# Patient Record
(demographics unavailable — no encounter records)

---

## 2025-02-06 NOTE — END OF VISIT
[] : Fellow [FreeTextEntry3] : I personally saw and examined this patient with the fellow physician and was present for the key portions of history taking, examination as well as the Mohs procedures performed .  I agree with the assessment and plan as documented in the fellow's note unless noted below.   Robinson Garcia MD Physician, Dermatology and Dermatologic Surgery [facility]

## 2025-02-06 NOTE — END OF VISIT
[] : Fellow [FreeTextEntry3] : I personally saw and examined this patient with the fellow physician and was present for the key portions of history taking, examination as well as the Mohs procedures performed .  I agree with the assessment and plan as documented in the fellow's note unless noted below.   Robinson Garcia MD Physician, Dermatology and Dermatologic Surgery [facility]

## 2025-02-06 NOTE — PROCEDURE
[TextEntry] : Mohs Surgery Procedure Note   A surgical time out was taken to confirm the patient's correct identity and the correct site. The operative site was identified by the patient and surgical team prior to surgery and the patient agreed to proceed with the surgical site which was marked prior to anesthesia infiltration.   Mohs Micrographic Surgery Report Date Collected: 02/05/2025 Date Received: Same as above Date Verified: Same as above Attending Surgeon: Robinson Garcia MD Fellow: Ann Kerr MD Assistants: Desirae Tom RN, Dyana Gonzalez MA Procedure#:  Diagnosis: BCC micronodular Location: Right Nasal Ala Pre-op Size: 1.6 cm x 1.1 cm Post-Operative Final Defect Size: 4.0 cm x 3.7 cm Stages (number of pieces per stage): 4 (2/1, 2/2, 1/3, 1/4) Pathology, if tumor noted in stages: Debulk with atypical basaloid proliferation c/w BCC. Stage I with atypical basaloid proliferation micronodular in the dermis and subcutis of piece 2. Stage II with atypical basaloid proliferation c/w BCC into the reticular dermis of piece 3. Stage III with atypical basaloid proliferation c/w BCC in the epidermis of piece 5. Stage IV with sparse perivascular lymphocytic infiltrate without evidence of residual carcinoma on sections examined  Indications for procedure: Ill-defined tumor margins, high-priority anatomic location for preservation of normal tissue, aggressive histologic nature of the tumor Repair type: N/A (Repair to be performed by Dr. Deutsch) Total volume of anesthesia: 15 mL    Mohs Procedure Report:   The patient was escorted to the outpatient surgical operatory. The proposed Mohs procedure and reconstruction options were discussed with the patient. The risks, benefits, and alternatives were discussed and the patient signed a written consent form. A time out was taken to confirm the patient's identity and the exact location of the skin cancer. After the patient was placed in a recumbent position, the surgical site was cleaned with alcohol and either Betadine (for eyes and ears) or chlorhexidine gluconate, draped, and infiltrated with 0.5% lidocaine with 1:200,000 epinephrine local anesthetic. A sterile surgical marking pen was used to outline a thin margin of normal-appearing skin around the tumor. A beveled incision was then made using a scalpel. Small orienting nicks were made on the specimen and the surrounding skin. The tissue was then sharply dissected from the surrounding skin. Hemostasis was maintained with the electrosurgical unit and/or pressure. A temporary dressing was placed on the surgical defect until the frozen section slides were interpreted. The oriented specimen was placed in a Tha dish and transported to the Mohs laboratory. For each stage of the procedure, a visual representation of the specimen was drawn on a Mohs map. This map graphically depicts the specimen's two-dimensional appearance, orientation, and tissue preparation, which consists of dividing the specimen and applying tissue dyes. Because the deep and peripheral portions of the tissue are then embedded in the same geometric plane, the map also represents an oriented scale drawing of the resulting histologic sections. The Mohs technician then prepared frozen section slides using standard techniques. The slides were stained with hematoxylin and eosin, and cover slips were applied. The frozen section slides were then examined under the microscope. If tumor was found, it was localized on the map. The orienting nicks on the original specimen corresponded to similar nicks on the surgical defect so areas of identified tumor could be mapped back to the patient and resected. Additional layers of removed skin were then processed as indicated above. This iterative process continued as applicable until no tumor was observed microscopically. At this stage, the Mohs resection was complete.  Referral for Reconstruction   After the Mohs procedure was completed, the patient was brought back to the minor surgery operatory. As previously arranged, Dr. Deutsch will repair the surgical defect. A pressure dressing composed of Vaseline ointment, Telfa and sterile gauze was securely taped into place. The patient was given complete verbal and written wound care instructions and was asked to follow up as requested. The patient ambulated from the minor surgery operatory without problems.

## 2025-02-06 NOTE — ASSESSMENT
[FreeTextEntry1] : Mohs surgery for BCC nodular and micronodular on the R nasal ala -- 4 stage(s) of Mohs surgery performed 02/05/2025 -- Repair to be performed by Dr. Deutsch in OR today -- f/u for wound check per Dr. Deutsch -- f/u for routine skin exams as previously recommended by Her referring dermatologist.   Thank you for this Mohs surgery referral.  Robinson Garcia MD Physician, Dermatology & Dermatologic Surgery [facility].

## 2025-02-06 NOTE — HISTORY OF PRESENT ILLNESS
[FreeTextEntry1] : Mohs surgery for micronodular BCC Right Nasal Ala [de-identified] : 02/05/2025  Referred by: Dr. Godinez  We had the pleasure of seeing your patient for Mohs Micrographic Surgery.  Ms. NEREYDA GILMORE is a 91 year old F who presents for evaluation of a BCC on the right nasal ala with micronodular features. Had noted as a growing bump x mos prior to bx. No previous treatment. Had been seen in July 2024 for the biopsy and treatment was delayed until now, she is set up for Mohs today and repair with Dr. Deutsch in the OR this afternoon.  Pertinent positives noted below  History of HIV or hepatitis: No Blood thinners: none Antibiotic Prophylaxis: None  Medical implants: None  Social History: no tobacco or alcohol  The patient's review of systems questionnaire was reviewed. Education needs were identified. There were no barriers to learning.  Mohs surgery consultation for Micronodular BCC Right Nasal Ala  -- I explained the treatment options of topical immunomodulatory or chemotherapy, electrodessication and curettage, radiation therapy, excision and Mohs surgery.  I recommended Mohs surgery due to the tumor type, location, and ill-defined nature of cancer.   Mohs Appropriate Use Criteria (AUC) Score: 9  I explained that following extirpation there will be a full thickness defect of the involved area. The reconstructive options will be based on the defect size and surrounding tissue laxity of the involved area. Primary closure is only possible for smaller defects. For larger defects, local tissue rearrangement or skin grafting may be necessary. Risks following layered primary closure or local tissue rearrangement include wound dehiscence, contour irregularity, bleeding, infection, and paresthesia (nerve damage including sensory deficit or motor weakness). Risks following skin grafting include wound dehiscence, skin graft nonadherence (partial or complete), contour irregularity, bleeding, infection, paresthesia, and donor site complications. I explained that the patient will need to abstain from physical activity for 1-2 weeks following the surgery, that they would heal with a scar, and also discussed the chances of infection, bleeding, potential sensory or motor nerve damage, and recurrence.  The patient indicated that s/he understood the risks and wished to proceed today -- In particular, for reconstruction we discussed repair with Dr. Deutsch in Plastics as scheduled  Thank you for this Mohs surgery referral. We recommended that Ms. NEREYDA GILMORE follow up with Her referring dermatologist for routine skin exams following surgery.

## 2025-02-06 NOTE — ASSESSMENT
[FreeTextEntry1] : Mohs surgery for BCC nodular and micronodular on the R nasal ala -- 4 stage(s) of Mohs surgery performed 02/05/2025 -- Repair to be performed by Dr. Deutsch in OR today -- f/u for wound check per Dr. Deutsch -- f/u for routine skin exams as previously recommended by Her referring dermatologist.   Thank you for this Mohs surgery referral.  Robinson Garcia MD Physician, Dermatology & Dermatologic Surgery [facility].

## 2025-02-06 NOTE — PHYSICAL EXAM
[Alert] : alert [Oriented x 3] : ~L oriented x 3 [Well Nourished] : well nourished [Conjunctiva Non-injected] : conjunctiva non-injected [No Visual Lymphadenopathy] : no visual  lymphadenopathy [No Clubbing] : no clubbing [No Edema] : no edema [No Bromhidrosis] : no bromhidrosis [No Chromhidrosis] : no chromhidrosis [Hair] : Hair [Scalp] : Scalp [Face] : Face [Nose] : Nose [Eyelids] : Eyelids [Ears] : Ears [Neck] : Neck [Nodes] : Nodes [FreeTextEntry3] : -right nasal ala with 1.6 cm x 1.1 cm pink nodule and scar -no cervical adenopathy

## 2025-02-06 NOTE — HISTORY OF PRESENT ILLNESS
[FreeTextEntry1] : Mohs surgery for micronodular BCC Right Nasal Ala [de-identified] : 02/05/2025  Referred by: Dr. Godinez  We had the pleasure of seeing your patient for Mohs Micrographic Surgery.  Ms. NEREYDA GILMORE is a 91 year old F who presents for evaluation of a BCC on the right nasal ala with micronodular features. Had noted as a growing bump x mos prior to bx. No previous treatment. Had been seen in July 2024 for the biopsy and treatment was delayed until now, she is set up for Mohs today and repair with Dr. Deutsch in the OR this afternoon.  Pertinent positives noted below  History of HIV or hepatitis: No Blood thinners: none Antibiotic Prophylaxis: None  Medical implants: None  Social History: no tobacco or alcohol  The patient's review of systems questionnaire was reviewed. Education needs were identified. There were no barriers to learning.  Mohs surgery consultation for Micronodular BCC Right Nasal Ala  -- I explained the treatment options of topical immunomodulatory or chemotherapy, electrodessication and curettage, radiation therapy, excision and Mohs surgery.  I recommended Mohs surgery due to the tumor type, location, and ill-defined nature of cancer.   Mohs Appropriate Use Criteria (AUC) Score: 9  I explained that following extirpation there will be a full thickness defect of the involved area. The reconstructive options will be based on the defect size and surrounding tissue laxity of the involved area. Primary closure is only possible for smaller defects. For larger defects, local tissue rearrangement or skin grafting may be necessary. Risks following layered primary closure or local tissue rearrangement include wound dehiscence, contour irregularity, bleeding, infection, and paresthesia (nerve damage including sensory deficit or motor weakness). Risks following skin grafting include wound dehiscence, skin graft nonadherence (partial or complete), contour irregularity, bleeding, infection, paresthesia, and donor site complications. I explained that the patient will need to abstain from physical activity for 1-2 weeks following the surgery, that they would heal with a scar, and also discussed the chances of infection, bleeding, potential sensory or motor nerve damage, and recurrence.  The patient indicated that s/he understood the risks and wished to proceed today -- In particular, for reconstruction we discussed repair with Dr. Deutsch in Plastics as scheduled  Thank you for this Mohs surgery referral. We recommended that Ms. NEREYDA GILMORE follow up with Her referring dermatologist for routine skin exams following surgery.

## 2025-04-22 NOTE — PHYSICAL EXAM
[No Acute Distress] : no acute distress [Well Nourished] : well nourished [Well Developed] : well developed [Well-Appearing] : well-appearing [No Respiratory Distress] : no respiratory distress  [No Accessory Muscle Use] : no accessory muscle use [Clear to Auscultation] : lungs were clear to auscultation bilaterally [Normal Rate] : normal rate  [Regular Rhythm] : with a regular rhythm [Normal] : normal rate, regular rhythm, normal S1 and S2 and no murmur heard [No Joint Swelling] : no joint swelling [No Rash] : no rash [Normal Affect] : the affect was normal [Alert and Oriented x3] : oriented to person, place, and time [Normal Mood] : the mood was normal [Normal Insight/Judgement] : insight and judgment were intact

## 2025-04-22 NOTE — PLAN
[FreeTextEntry1] :  Labs and EKG reviewed and are within normal limits  Pt. is medically cleared for impending facial surgery

## 2025-04-22 NOTE — HISTORY OF PRESENT ILLNESS
[FreeTextEntry1] :  I am here for medical clearance. I am having a revision to my facial plastic surgery [de-identified] : Ms. Prescott presents today for medical clearance for impending plastic surgery to her face. She informs that she is feeling well, no acute distress or complaints.